# Patient Record
(demographics unavailable — no encounter records)

---

## 2025-06-09 NOTE — HISTORY OF PRESENT ILLNESS
[TextBox_4] : 68-year-old male with mild intermittent asthma usually in the spring with exposure to pollens.  He is well-maintained on Advair discus 2 50-50.  He ran out of Advair about 1 week ago.  About 4 days later he had a minor asthma flare with chest tightness and slight wheezing.  He used albuterol and improved.  He then got a refill on Advair and has restarted and now feels well.  He would like to continue Advair.  He also is known to have a 9 mm right lower lobe nodule being followed.  His last CAT scan was October 2024.  He will be recommended to have 1 more scan October 2025.  If the nodule is unchanged at that point we will stop doing CT surveillance.

## 2025-06-09 NOTE — ASSESSMENT
[FreeTextEntry1] : Mild intermittent asthma currently doing well on Advair.  He will continue Advair discus 2 50-51 elation twice daily.  In terms of his 9 mm pulmonary nodule this will be monitored with a repeat CAT scan in October.  If the nodule is unchanged we will stop doing surveillance CAT scans.

## 2025-06-09 NOTE — PHYSICAL EXAM
[No Acute Distress] : no acute distress [Normal Oropharynx] : normal oropharynx [No Neck Mass] : no neck mass [Normal Appearance] : normal appearance [Normal Rate/Rhythm] : normal rate/rhythm [Normal S1, S2] : normal s1, s2 [No Murmurs] : no murmurs [Clear to Auscultation Bilaterally] : clear to auscultation bilaterally [No Resp Distress] : no resp distress [No Abnormalities] : no abnormalities [Benign] : benign [Normal Gait] : normal gait [No Clubbing] : no clubbing [No Edema] : no edema [No Cyanosis] : no cyanosis [FROM] : FROM [Normal Color/ Pigmentation] : normal color/ pigmentation [No Focal Deficits] : no focal deficits [Oriented x3] : oriented x3 [Normal Affect] : normal affect